# Patient Record
Sex: MALE | Race: WHITE | Employment: OTHER | URBAN - METROPOLITAN AREA
[De-identification: names, ages, dates, MRNs, and addresses within clinical notes are randomized per-mention and may not be internally consistent; named-entity substitution may affect disease eponyms.]

---

## 2017-06-22 PROBLEM — J30.2 SEASONAL ALLERGIC RHINITIS, UNSPECIFIED ALLERGIC RHINITIS TRIGGER: Status: ACTIVE | Noted: 2017-06-22

## 2017-07-17 PROCEDURE — 87045 FECES CULTURE AEROBIC BACT: CPT | Performed by: FAMILY MEDICINE

## 2017-07-17 PROCEDURE — 87269 GIARDIA AG IF: CPT | Performed by: FAMILY MEDICINE

## 2017-07-17 PROCEDURE — 87177 OVA AND PARASITES SMEARS: CPT | Performed by: FAMILY MEDICINE

## 2017-07-17 PROCEDURE — 87046 STOOL CULTR AEROBIC BACT EA: CPT | Performed by: FAMILY MEDICINE

## 2017-07-17 PROCEDURE — 87209 SMEAR COMPLEX STAIN: CPT | Performed by: FAMILY MEDICINE

## 2018-06-06 PROCEDURE — 81003 URINALYSIS AUTO W/O SCOPE: CPT | Performed by: FAMILY MEDICINE

## 2018-08-27 PROCEDURE — 86256 FLUORESCENT ANTIBODY TITER: CPT | Performed by: ALLERGY & IMMUNOLOGY

## 2018-08-27 PROCEDURE — 83516 IMMUNOASSAY NONANTIBODY: CPT | Performed by: ALLERGY & IMMUNOLOGY

## 2018-08-27 PROCEDURE — 36415 COLL VENOUS BLD VENIPUNCTURE: CPT | Performed by: ALLERGY & IMMUNOLOGY

## 2018-08-27 PROCEDURE — 82784 ASSAY IGA/IGD/IGG/IGM EACH: CPT | Performed by: ALLERGY & IMMUNOLOGY

## 2018-12-26 PROBLEM — M77.9 TENDONITIS: Status: ACTIVE | Noted: 2018-12-26

## 2019-07-29 PROBLEM — J30.2 SEASONAL ALLERGIC RHINITIS: Status: ACTIVE | Noted: 2017-06-22

## 2019-07-29 PROBLEM — M77.9 TENDONITIS: Status: RESOLVED | Noted: 2018-12-26 | Resolved: 2019-07-29

## 2019-09-03 PROBLEM — M48.02 FORAMINAL STENOSIS OF CERVICAL REGION: Status: ACTIVE | Noted: 2019-09-03

## 2019-09-03 PROBLEM — M48.02 CERVICAL SPINAL STENOSIS: Status: ACTIVE | Noted: 2019-09-03

## 2019-10-11 PROBLEM — M51.26 HNP (HERNIATED NUCLEUS PULPOSUS), LUMBAR: Status: ACTIVE | Noted: 2019-10-11

## 2019-10-11 PROBLEM — M48.061 SPINAL STENOSIS OF LUMBAR REGION, UNSPECIFIED WHETHER NEUROGENIC CLAUDICATION PRESENT: Status: ACTIVE | Noted: 2019-10-11

## 2020-12-17 PROBLEM — J22 LRTI (LOWER RESPIRATORY TRACT INFECTION): Status: RESOLVED | Noted: 2020-12-17 | Resolved: 2020-12-17

## 2020-12-17 PROBLEM — M51.26 HNP (HERNIATED NUCLEUS PULPOSUS), LUMBAR: Status: RESOLVED | Noted: 2019-10-11 | Resolved: 2020-12-17

## 2020-12-17 PROBLEM — J22 LRTI (LOWER RESPIRATORY TRACT INFECTION): Status: ACTIVE | Noted: 2020-12-17

## 2021-04-12 DIAGNOSIS — Z23 NEED FOR VACCINATION: ICD-10-CM

## 2021-06-17 PROCEDURE — 88305 TISSUE EXAM BY PATHOLOGIST: CPT | Performed by: INTERNAL MEDICINE

## 2023-08-09 RX ORDER — CHLORHEXIDINE GLUCONATE 4 G/100ML
30 SOLUTION TOPICAL
Status: CANCELLED | OUTPATIENT
Start: 2023-08-10 | End: 2023-08-10

## 2023-08-15 ENCOUNTER — HOSPITAL ENCOUNTER (OUTPATIENT)
Dept: INTERVENTIONAL RADIOLOGY/VASCULAR | Facility: HOSPITAL | Age: 63
Discharge: HOME | End: 2023-08-15
Attending: INTERNAL MEDICINE | Admitting: INTERNAL MEDICINE
Payer: COMMERCIAL

## 2023-08-15 VITALS
DIASTOLIC BLOOD PRESSURE: 83 MMHG | BODY MASS INDEX: 25.2 KG/M2 | SYSTOLIC BLOOD PRESSURE: 127 MMHG | RESPIRATION RATE: 18 BRPM | HEIGHT: 71 IN | WEIGHT: 180 LBS | TEMPERATURE: 98 F | HEART RATE: 72 BPM | OXYGEN SATURATION: 96 %

## 2023-08-15 DIAGNOSIS — Z01.818 PRE-OP TESTING: Primary | ICD-10-CM

## 2023-08-15 DIAGNOSIS — I20.0 UNSTABLE ANGINA (HCC): ICD-10-CM

## 2023-08-15 DIAGNOSIS — I25.10 CAD (CORONARY ARTERY DISEASE), NATIVE CORONARY ARTERY: ICD-10-CM

## 2023-08-15 PROCEDURE — 93458 L HRT ARTERY/VENTRICLE ANGIO: CPT | Performed by: INTERNAL MEDICINE

## 2023-08-15 PROCEDURE — 99152 MOD SED SAME PHYS/QHP 5/>YRS: CPT | Performed by: INTERNAL MEDICINE

## 2023-08-15 PROCEDURE — 36415 COLL VENOUS BLD VENIPUNCTURE: CPT

## 2023-08-15 PROCEDURE — B2111ZZ FLUOROSCOPY OF MULTIPLE CORONARY ARTERIES USING LOW OSMOLAR CONTRAST: ICD-10-PCS | Performed by: INTERNAL MEDICINE

## 2023-08-15 PROCEDURE — 99153 MOD SED SAME PHYS/QHP EA: CPT | Performed by: INTERNAL MEDICINE

## 2023-08-15 PROCEDURE — 4A023N8 MEASUREMENT OF CARDIAC SAMPLING AND PRESSURE, BILATERAL, PERCUTANEOUS APPROACH: ICD-10-PCS | Performed by: INTERNAL MEDICINE

## 2023-08-15 RX ORDER — HEPARIN SODIUM 1000 [USP'U]/ML
INJECTION, SOLUTION INTRAVENOUS; SUBCUTANEOUS
Status: COMPLETED
Start: 2023-08-15 | End: 2023-08-15

## 2023-08-15 RX ORDER — BIVALIRUDIN 250 MG/5ML
INJECTION, POWDER, LYOPHILIZED, FOR SOLUTION INTRAVENOUS
Status: COMPLETED
Start: 2023-08-15 | End: 2023-08-15

## 2023-08-15 RX ORDER — ASPIRIN 81 MG/1
324 TABLET, CHEWABLE ORAL ONCE
Status: COMPLETED | OUTPATIENT
Start: 2023-08-15 | End: 2023-08-15

## 2023-08-15 RX ORDER — SODIUM CHLORIDE 9 MG/ML
INJECTION, SOLUTION INTRAVENOUS CONTINUOUS
Status: DISCONTINUED | OUTPATIENT
Start: 2023-08-15 | End: 2023-08-15

## 2023-08-15 RX ORDER — ISOSORBIDE MONONITRATE 30 MG/1
30 TABLET, EXTENDED RELEASE ORAL DAILY
Qty: 30 TABLET | Refills: 3 | Status: SHIPPED | OUTPATIENT
Start: 2023-08-15

## 2023-08-15 RX ORDER — MIDAZOLAM HYDROCHLORIDE 1 MG/ML
INJECTION INTRAMUSCULAR; INTRAVENOUS
Status: COMPLETED
Start: 2023-08-15 | End: 2023-08-15

## 2023-08-15 RX ORDER — SODIUM CHLORIDE 9 MG/ML
INJECTION, SOLUTION INTRAVENOUS
Status: COMPLETED | OUTPATIENT
Start: 2023-08-15 | End: 2023-08-15

## 2023-08-15 RX ORDER — VERAPAMIL HYDROCHLORIDE 2.5 MG/ML
INJECTION, SOLUTION INTRAVENOUS
Status: COMPLETED
Start: 2023-08-15 | End: 2023-08-15

## 2023-08-15 RX ORDER — ASPIRIN 81 MG/1
81 TABLET ORAL DAILY
Status: DISCONTINUED | OUTPATIENT
Start: 2023-08-16 | End: 2023-08-15

## 2023-08-15 RX ORDER — ASPIRIN 81 MG/1
324 TABLET, CHEWABLE ORAL ONCE
COMMUNITY

## 2023-08-15 RX ORDER — LIDOCAINE HYDROCHLORIDE 20 MG/ML
INJECTION, SOLUTION EPIDURAL; INFILTRATION; INTRACAUDAL; PERINEURAL
Status: COMPLETED
Start: 2023-08-15 | End: 2023-08-15

## 2023-08-15 RX ORDER — 0.9 % SODIUM CHLORIDE 0.9 %
INTRAVENOUS SOLUTION INTRAVENOUS
Status: COMPLETED
Start: 2023-08-15 | End: 2023-08-15

## 2023-08-15 RX ORDER — NITROGLYCERIN 20 MG/100ML
INJECTION INTRAVENOUS
Status: COMPLETED
Start: 2023-08-15 | End: 2023-08-15

## 2023-08-15 RX ORDER — NITROGLYCERIN 20 MG/100ML
INJECTION INTRAVENOUS
Status: DISCONTINUED
Start: 2023-08-15 | End: 2023-08-15 | Stop reason: WASHOUT

## 2023-08-15 RX ADMIN — ASPIRIN 324 MG: 81 TABLET, CHEWABLE ORAL at 09:00:00

## 2023-08-15 RX ADMIN — SODIUM CHLORIDE: 9 INJECTION, SOLUTION INTRAVENOUS at 09:00:00

## 2023-08-15 NOTE — INTERVAL H&P NOTE
Pre-op Diagnosis: * No pre-op diagnosis entered *    The above referenced H&P was reviewed by Audrey Palomares MD on 8/15/2023, the patient was examined and no significant changes have occurred in the patient's condition since the H&P was performed. I discussed with the patient and/or legal representative the potential benefits, risks and side effects of this procedure; the likelihood of the patient achieving goals; and potential problems that might occur during recuperation. I discussed reasonable alternatives to the procedure, including risks, benefits and side effects related to the alternatives and risks related to not receiving this procedure. We will proceed with procedure as planned.

## 2023-08-15 NOTE — DISCHARGE INSTRUCTIONS
Transradial Angiogram Discharge Instructions    The following instructions are for patient who have had an angiogram, cardiac cath, angioplasty, or stent through the radial artery. Proper skin puncture site care is important during the healing period. This guideline should help to remind you of the verbal instructions you received from your physician or nurse. Site: right    Site Care: For 5 days after the procedure, make sure the wrist is not submerged in water or any liquid. Leave bandage in place for 24 hours. Then, gently wash with soap and water. Do no put any other bandage, ointment, powders, or creams to the site. For local swelling: apply ice  If bleeding occurs, elevate the hand above the heart and apply local pressure    Activity/Driving:  Avoid wrist flexion, extension, and fine motor activities (i.e. Texting, typing, using a computer mouse, etc.) for 24 hours. Do not drive for 24 hours. Do not lift or pull anything heavier than 10 pounnds with affected hand for 1 week. Additional Instructions:  Do not take glucophage/metformin containing products for at least 48 hours after procedure, unless otherwise directed by your physician. When to contact your physician  Call Dr. Juan Blue at [phone] right away if you experience any of the following:    Swelling, pain, or bleeding at the site that is not relieved by applying ice or pressure  Signs of infection: redness, warmth, drainage at the site, chills, or temperature of 100.5 degrees or greater.   Changes in sensation, numbness, or tingling of affected hand

## 2023-08-15 NOTE — IVS NOTE
DISCHARGE NOTE    1, PATIENT AWAKE AND ALERT X 4    2. QURESHI, VSS, NO PONV, NO PAIN    3. INSTRUCTIONS GIVEN TO PATIENT AND FAMILY    4. IV ACCESS WAS REMOVED BEFORE DISCHARGE    5. DR. Craig Knott       SPOKE WITH PATIENT AND FAMILY POST PROCEDURE    6. PATIENT ABLE TO DRINK, [address], VOID    7. PROCEDURAL SITE REMAINS DRY, INTACT WITH GOOD CIRCULATION,    MOVEMENT AND SENSATION    8. Alvin Angulo    9. PATIENT DISCHARGED VIA WHEEL CHAIR TO main entrance    10.  NEW PRESCRIPTION:Margie Vanessa was called prescription will ready  in 1/2 hour,  Per the spouse he was already using right hand to do multiple different things

## 2023-08-15 NOTE — PROCEDURES
Procedure Report    Indication for procedure: CAD, unstable angina    Procedures performed:  1) Left heart catheterization  2) Coronary angiography   3) iFR of intermediate prox-mid RCA stenosis (JUANITO 3) with value 0.97, not consistent with HD significant flow obstruction  4) Supervision of moderate sedation from 0928 to 1008, with a total of 4mg versed and 100mcg fentanyl. Sedation administered by certified nursing staff and supervised by me directly  5) Radial band application for patent hemostasis    After informed consent was obtained patient was brought to the cardiac cath lab. Patient prepped and draped in sterile fashion. Using the modified Seldinger technique, access obtained in the right radial artery with a 6F slender sheath. Selective right and left coronary angiogram performed with a TIG catheter. Intermediate prox-mid RCA stenosis noted, decision made to investigate further. A JR4 guide catheter was engaged in the RCA. Angimax was used for anticoagulation per cath lab protocol. A pressure wire was advanced into the distal vessel and iFR performed with value 0.97, not consistent with HD significant flow obstruction. Finally, pigtail catheter positioned in the LV for pressure measurement and gradient across the aortic valve. On completion of procedure all catheters removed, radial sheath removed and radial band applied for patent hemostasis. Patient tolerated procedure well without immediate complications, transferred to recovery area in stable condition.     Findings:  LM angiographically normal, bifurcates into LAD and Lcx  LAD has widely patent previously placed prox-mid stents  Lcx nondominant, angiographically normal  RCA dominant, ~50% prox-mid stenosis (JUANITO 3); s/p iFR of intermediate prox-mid RCA stenosis (JUANITO 3) with value 0.97, not consistent with HD significant flow obstruction  LVEDP 20mmHg  ~20mmHg peak to peak gradient on LV-Ao pullback    Recommendations:  1) Radial band deflation per protocol  2) Medical management and risk factor modification  3) Outpatient followup in 1-2 weeks

## 2025-07-09 ENCOUNTER — APPOINTMENT (OUTPATIENT)
Dept: CT IMAGING | Facility: HOSPITAL | Age: 65
End: 2025-07-09
Attending: EMERGENCY MEDICINE
Payer: COMMERCIAL

## 2025-07-09 ENCOUNTER — HOSPITAL ENCOUNTER (EMERGENCY)
Facility: HOSPITAL | Age: 65
Discharge: HOME | End: 2025-07-09
Attending: EMERGENCY MEDICINE
Payer: COMMERCIAL

## 2025-07-09 VITALS
OXYGEN SATURATION: 100 % | BODY MASS INDEX: 25.77 KG/M2 | SYSTOLIC BLOOD PRESSURE: 160 MMHG | HEIGHT: 70 IN | TEMPERATURE: 98 F | HEART RATE: 59 BPM | WEIGHT: 180 LBS | RESPIRATION RATE: 19 BRPM | DIASTOLIC BLOOD PRESSURE: 97 MMHG

## 2025-07-09 DIAGNOSIS — R19.7 DIARRHEA, UNSPECIFIED TYPE: ICD-10-CM

## 2025-07-09 DIAGNOSIS — R10.9 ABDOMINAL PAIN OF UNKNOWN ETIOLOGY: Primary | ICD-10-CM

## 2025-07-09 LAB
ALBUMIN SERPL-MCNC: 4.3 G/DL (ref 3.2–4.8)
ALBUMIN/GLOB SERPL: 2.2 {RATIO} (ref 1–2)
ALP LIVER SERPL-CCNC: 67 U/L (ref 45–117)
ALT SERPL-CCNC: 26 U/L (ref 10–49)
ANION GAP SERPL CALC-SCNC: 5 MMOL/L (ref 0–18)
AST SERPL-CCNC: 32 U/L (ref ?–34)
BASOPHILS # BLD AUTO: 0.05 X10(3) UL (ref 0–0.2)
BASOPHILS NFR BLD AUTO: 1.1 %
BILIRUB SERPL-MCNC: 0.9 MG/DL (ref 0.2–1.1)
BILIRUB UR QL STRIP.AUTO: NEGATIVE
BUN BLD-MCNC: 14 MG/DL (ref 9–23)
CALCIUM BLD-MCNC: 8.9 MG/DL (ref 8.7–10.6)
CHLORIDE SERPL-SCNC: 106 MMOL/L (ref 98–112)
CLARITY UR REFRACT.AUTO: CLEAR
CO2 SERPL-SCNC: 30 MMOL/L (ref 21–32)
CREAT BLD-MCNC: 1.11 MG/DL (ref 0.7–1.3)
EGFRCR SERPLBLD CKD-EPI 2021: 75 ML/MIN/1.73M2 (ref 60–?)
EOSINOPHIL # BLD AUTO: 0.26 X10(3) UL (ref 0–0.7)
EOSINOPHIL NFR BLD AUTO: 6 %
ERYTHROCYTE [DISTWIDTH] IN BLOOD BY AUTOMATED COUNT: 12.1 %
GLOBULIN PLAS-MCNC: 2 G/DL (ref 2–3.5)
GLUCOSE BLD-MCNC: 106 MG/DL (ref 70–99)
GLUCOSE UR STRIP.AUTO-MCNC: NORMAL MG/DL
HCT VFR BLD AUTO: 40.3 % (ref 39–53)
HGB BLD-MCNC: 13.7 G/DL (ref 13–17.5)
IMM GRANULOCYTES # BLD AUTO: 0.01 X10(3) UL (ref 0–1)
IMM GRANULOCYTES NFR BLD: 0.2 %
KETONES UR STRIP.AUTO-MCNC: NEGATIVE MG/DL
LEUKOCYTE ESTERASE UR QL STRIP.AUTO: NEGATIVE
LIPASE SERPL-CCNC: 33 U/L (ref 12–53)
LYMPHOCYTES # BLD AUTO: 1 X10(3) UL (ref 1–4)
LYMPHOCYTES NFR BLD AUTO: 23 %
MCH RBC QN AUTO: 31.6 PG (ref 26–34)
MCHC RBC AUTO-ENTMCNC: 34 G/DL (ref 31–37)
MCV RBC AUTO: 93.1 FL (ref 80–100)
MONOCYTES # BLD AUTO: 0.54 X10(3) UL (ref 0.1–1)
MONOCYTES NFR BLD AUTO: 12.4 %
NEUTROPHILS # BLD AUTO: 2.49 X10 (3) UL (ref 1.5–7.7)
NEUTROPHILS # BLD AUTO: 2.49 X10(3) UL (ref 1.5–7.7)
NEUTROPHILS NFR BLD AUTO: 57.3 %
NITRITE UR QL STRIP.AUTO: NEGATIVE
OSMOLALITY SERPL CALC.SUM OF ELEC: 293 MOSM/KG (ref 275–295)
PH UR STRIP.AUTO: 6.5 [PH] (ref 5–8)
PLATELET # BLD AUTO: 210 10(3)UL (ref 150–450)
POTASSIUM SERPL-SCNC: 4.1 MMOL/L (ref 3.5–5.1)
PROT SERPL-MCNC: 6.3 G/DL (ref 5.7–8.2)
PROT UR STRIP.AUTO-MCNC: NEGATIVE MG/DL
RBC # BLD AUTO: 4.33 X10(6)UL (ref 4.3–5.7)
RBC UR QL AUTO: NEGATIVE
SODIUM SERPL-SCNC: 141 MMOL/L (ref 136–145)
SP GR UR STRIP.AUTO: 1.01 (ref 1–1.03)
TSI SER-ACNC: 2.59 UIU/ML (ref 0.55–4.78)
UROBILINOGEN UR STRIP.AUTO-MCNC: NORMAL MG/DL
WBC # BLD AUTO: 4.4 X10(3) UL (ref 4–11)

## 2025-07-09 PROCEDURE — 99284 EMERGENCY DEPT VISIT MOD MDM: CPT

## 2025-07-09 PROCEDURE — 80053 COMPREHEN METABOLIC PANEL: CPT | Performed by: EMERGENCY MEDICINE

## 2025-07-09 PROCEDURE — 84443 ASSAY THYROID STIM HORMONE: CPT | Performed by: EMERGENCY MEDICINE

## 2025-07-09 PROCEDURE — 83690 ASSAY OF LIPASE: CPT | Performed by: EMERGENCY MEDICINE

## 2025-07-09 PROCEDURE — 36415 COLL VENOUS BLD VENIPUNCTURE: CPT

## 2025-07-09 PROCEDURE — 81003 URINALYSIS AUTO W/O SCOPE: CPT | Performed by: EMERGENCY MEDICINE

## 2025-07-09 PROCEDURE — 85025 COMPLETE CBC W/AUTO DIFF WBC: CPT | Performed by: EMERGENCY MEDICINE

## 2025-07-09 PROCEDURE — 74177 CT ABD & PELVIS W/CONTRAST: CPT | Performed by: EMERGENCY MEDICINE

## 2025-07-09 NOTE — ED PROVIDER NOTES
Patient Seen in: [facility] Emergency Department        History  Chief Complaint   Patient presents with    Abdomen/Flank Pain    Nausea/Vomiting/Diarrhea     Stated Complaint: nvd,abd pain    Subjective:   HPI            64-year-old gentleman, history of CAD, here for evaluation of intermittent diarrhea over the past 2 months.  Also reports some left-sided abdominal discomfort.  States he will have multiple loose nonbloody bowel movements per day, has started taking Imodium to minimize the number of bowel movements.  Last took Imodium last night.  States nothing preceded the symptoms, no recent antibiotics or travel no close contacts with similar symptoms.  States he has had a colonoscopy most recently about 5 years ago.  Denies any fevers any weight loss, any other complaints or concerns.  Does report some associated mild left-sided abdominal discomfort.    Objective:     Past Medical History:    Atherosclerosis of coronary artery    Back pain    Obstructive apnea    DMG PSG AHI 6 SaO2 roselyn 91%    Other and unspecified hyperlipidemia              Past Surgical History:   Procedure Laterality Date    Angioplasty (coronary)      Colonoscopy N/A 6/17/2021    Procedure: COLONOSCOPY W/ polypectomy;  Surgeon: Poncho De La O MD;  Location: [facility]    Colonoscopy,biopsy N/A 12/3/2015    Procedure: ESOPHAGOGASTRODUODENOSCOPY, COLONOSCOPY, POSSIBLE BIOPSY, POSSIBLE POLYPECTOMY 69699, 80160;  Surgeon: Poncho De La O MD;  Location: [facility]    Fluor gid & loclzj ndl/cath spi dx/ther njx N/A 9/16/2014    Procedure: LUMBAR EPIDURAL;  Surgeon: Omega Barros MD;  Location: [facility]    Injection proc for sacroiliac joint arthrography &/or anesthetic/steroid Bilateral 6/2/2016    Procedure: SI JOINT INJECTION;  Surgeon: Omega Barros MD;  Location: [facility]    Injection, w/wo contrast, dx/therapeutic substance, epidural/subarachnoid;  lumbar/sacral N/A 9/16/2014    Procedure: LUMBAR EPIDURAL;  Surgeon: Omega Barros MD;  Location: [facility]    Other surgical history      back x 2    Skin surgery Left 08/26/2019    Excision, L posterior shoulder     Upper gi endoscopy,biopsy N/A 12/3/2015    Procedure: ESOPHAGOGASTRODUODENOSCOPY, COLONOSCOPY, POSSIBLE BIOPSY, POSSIBLE POLYPECTOMY 69079, 91696;  Surgeon: Poncho De La O MD;  [place] [facility]                Social History     Socioeconomic History    Marital status:    Tobacco Use    Smoking status: Never    Smokeless tobacco: Never   Vaping Use    Vaping status: Never Used   Substance and Sexual Activity    Alcohol use: Yes     Alcohol/week: 0.0 standard drinks of alcohol     Comment: socially    Drug use: No     Comment: gummy                                Physical Exam    ED Triage Vitals [07/09/25 0945]   BP (!) 163/102   Pulse 66   Resp 16   Temp 97.7 °F (36.5 °C)   Temp src Temporal   SpO2 98 %   O2 Device None (Room air)       Current Vitals:   Vital Signs  BP: (!) 160/97  Pulse: 59  Resp: 19  Temp: 97.7 °F (36.5 °C)  Temp src: Temporal  MAP (mmHg): (!) 117    Oxygen Therapy  SpO2: 100 %  O2 Device: None (Room air)            Physical Exam      Physical Exam  Vitals signs and nursing note reviewed.   General:  Patient laying supine in the bed in no acute distress  Head: Normocephalic and atraumatic.   HEENT:  Mucous membranes are moist.   Cardiovascular:  Normal rate and regular rhythm.  No Edema  Pulmonary:  Pulmonary effort is normal.  Normal breath sounds. No wheezing, rhonchi or rales.   Abdominal: Mild left lower quadrant tenderness no guarding or rebound.  No tenderness at McBurney's point, Vences sign is negative.  Bowel sounds are normal.  Skin: Warm and dry  Neurological: Awake alert, speech is normal    Abdomen is soft nondistended there is        ED Course  Labs Reviewed   COMP METABOLIC PANEL (14) - Abnormal; Notable for the  following components:       Result Value    Glucose 106 (*)     A/G Ratio 2.2 (*)     All other components within normal limits   LIPASE - Normal   TSH W REFLEX TO FREE T4 - Normal   CBC WITH DIFFERENTIAL WITH PLATELET   URINALYSIS WITH CULTURE REFLEX   RAINBOW DRAW LAVENDER   RAINBOW DRAW LIGHT GREEN   RAINBOW DRAW BLUE          CT ABDOMEN+PELVIS(CONTRAST ONLY)(CPT=74177)  Result Date: 7/9/2025  PROCEDURE: CT ABDOMEN+PELVIS(CONTRAST ONLY)(CPT=74177) INDICATIONS: Intermittent diarrhea for 2 months left lower quadrant pain COMPARISON: No comparisons. TECHNIQUE: CT imaging of the abdomen and pelvis was performed with intravenous contrast material. Automated exposure control techniques for dose reduction were used, adjustment of the mA and/or kV were based on patient's size. Use of iterative reconstruction technique for dose reduction was used. Dose information is transmitted to the ACR (American College of Radiology) NRDR (National Radiology Data Registry) which includes the Dose Index Registry. CONTRAST: IOPAMIDOL 76% IV SOLN FOR POWER INJECTOR:100 mL FINDINGS: LIVER: No enlargement, atrophy, abnormal density, or significant focal lesion. BILIARY: No visible dilatation or calcification. PANCREAS: No lesion, fluid collection, ductal dilatation, or atrophy. SPLEEN: No enlargement or focal lesion. KIDNEYS: Normal. No mass, obstruction, or calcification. Simple left renal cyst requiring no further work-up or follow-up. ADRENALS: No mass or enlargement. AORTA/VASCULAR: Trace atherosclerosis. No aneurysm. 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 RETROPERITONEUM: No mass or enlarged adenopathy. BOWEL/MESENTERY: Uncomplicated colonic diverticula are most numerous in the sigmoid colon. No bowel distention or bowel wall thickening. Appendix not clearly identified with no pericecal inflammation. ABDOMINAL WALL: Small fat-containing umbilical bilateral inguinal hernias. URINARY BLADDER: Normal. No  visible focal wall thickening, lesion, or calculus. PELVIC NODES: Normal. No adenopathy. PELVIC ORGANS: Normal. No visible mass. Pelvic organs appropriate for patient age. BONES: Degenerative changes of spine with postsurgical changes of posterior fusion at L5-S1. LUNG BASES: No visible pulmonary or pleural disease.     CONCLUSION: 1.  No acute abdominal or pelvic pathology. 2.  Uncomplicated colonic diverticula, most numerous in the sigmoid colon.. 3.  Small fat-containing umbilical and bilateral inguinal hernias. Electronically Verified and Signed by Attending Radiologist: Robert Castaneda MD 7/9/2025 12:43 PM Workstation: [id number]                      MDM  This is a 64-year-old gentleman history of CAD, here for evaluation of diarrhea left-sided abdominal discomfort symptoms ongoing for the past 2 months.  Differential includes colitis, bacterial diarrhea, irritable bowel syndrome electrolyte abnormality.  Basic labs were ordered and reviewed show no acute abnormalities.  Does have some left-sided abdominal tenderness will obtain a CT scan of the abdomen pelvis reevaluate.  Does not feel he will be able to provide a stool sample as he did take Imodium last night, however if workup unremarkable here will send with an order for stool sample, and have him arrange for GI follow-up in the meantime.  He is otherwise afebrile hemodynamically well.    CT abdomen pelvis shows no acute abnormalities basic labs are unremarkable patient was discharged home to follow-up with GI, order for stool culture provided.    Medical Decision Making      Disposition and Plan     Clinical Impression:  1. Abdominal pain of unknown etiology    2. Diarrhea, unspecified type         Disposition:  Discharge  7/9/2025  1:27 pm    Follow-up:  Nonstaff, Physician    Follow up  Follow-up with your PMD for reevaluation in 24 to 48 hours.  Return to ER if symptoms worsen or change or if any other new concerns.    [facility] -  [address] [address]  Schedule an appointment as soon as possible for a visit in 1 day(s)            Medications Prescribed:  Discharge Medication List as of 7/9/2025  1:43 PM                Supplementary Documentation:                                                                  No

## 2025-07-09 NOTE — ED INITIAL ASSESSMENT (HPI)
Pt arrives to ED for evaluation of GI issues, diarrhea daily, stomach pain, pt wants to verify if does not have a virus before he leaves the country. Pt denies fever. Pt has diarrhea multiple times a day usually after a meal.

## 2025-07-10 ENCOUNTER — LAB ENCOUNTER (OUTPATIENT)
Dept: LAB | Age: 65
End: 2025-07-10
Attending: EMERGENCY MEDICINE
Payer: COMMERCIAL

## 2025-07-10 DIAGNOSIS — R19.7 DIARRHEA, UNSPECIFIED TYPE: ICD-10-CM

## 2025-07-10 LAB
CRYPTOSP AG STL QL IA: NEGATIVE
G LAMBLIA AG STL QL IA: NEGATIVE

## 2025-07-10 PROCEDURE — 87493 C DIFF AMPLIFIED PROBE: CPT

## 2025-07-10 PROCEDURE — 87077 CULTURE AEROBIC IDENTIFY: CPT

## 2025-07-10 PROCEDURE — 87272 CRYPTOSPORIDIUM AG IF: CPT

## 2025-07-10 PROCEDURE — 87015 SPECIMEN INFECT AGNT CONCNTJ: CPT

## 2025-07-10 PROCEDURE — 87046 STOOL CULTR AEROBIC BACT EA: CPT

## 2025-07-10 PROCEDURE — 87045 FECES CULTURE AEROBIC BACT: CPT

## 2025-07-10 PROCEDURE — 87209 SMEAR COMPLEX STAIN: CPT

## 2025-07-10 PROCEDURE — 87427 SHIGA-LIKE TOXIN AG IA: CPT

## 2025-07-10 PROCEDURE — 87329 GIARDIA AG IA: CPT

## 2025-07-10 PROCEDURE — 87177 OVA AND PARASITES SMEARS: CPT

## 2025-07-11 LAB — C DIFF TOX B STL QL: NEGATIVE
